# Patient Record
Sex: FEMALE | Race: WHITE | NOT HISPANIC OR LATINO
[De-identification: names, ages, dates, MRNs, and addresses within clinical notes are randomized per-mention and may not be internally consistent; named-entity substitution may affect disease eponyms.]

---

## 2018-09-06 ENCOUNTER — FORM ENCOUNTER (OUTPATIENT)
Age: 38
End: 2018-09-06

## 2018-11-26 ENCOUNTER — FORM ENCOUNTER (OUTPATIENT)
Age: 38
End: 2018-11-26

## 2019-02-12 ENCOUNTER — APPOINTMENT (OUTPATIENT)
Dept: PEDIATRIC ALLERGY IMMUNOLOGY | Facility: CLINIC | Age: 39
End: 2019-02-12
Payer: COMMERCIAL

## 2019-02-12 VITALS
SYSTOLIC BLOOD PRESSURE: 119 MMHG | WEIGHT: 155.21 LBS | HEIGHT: 64.37 IN | OXYGEN SATURATION: 99 % | DIASTOLIC BLOOD PRESSURE: 77 MMHG | HEART RATE: 88 BPM | BODY MASS INDEX: 26.5 KG/M2

## 2019-02-12 DIAGNOSIS — H10.10 ACUTE ATOPIC CONJUNCTIVITIS, UNSPECIFIED EYE: ICD-10-CM

## 2019-02-12 DIAGNOSIS — R91.1 SOLITARY PULMONARY NODULE: ICD-10-CM

## 2019-02-12 DIAGNOSIS — Z82.5 FAMILY HISTORY OF ASTHMA AND OTHER CHRONIC LOWER RESPIRATORY DISEASES: ICD-10-CM

## 2019-02-12 DIAGNOSIS — Z78.9 OTHER SPECIFIED HEALTH STATUS: ICD-10-CM

## 2019-02-12 DIAGNOSIS — J45.50 SEVERE PERSISTENT ASTHMA, UNCOMPLICATED: ICD-10-CM

## 2019-02-12 DIAGNOSIS — L85.3 XEROSIS CUTIS: ICD-10-CM

## 2019-02-12 DIAGNOSIS — T50.905A ADVERSE EFFECT OF UNSPECIFIED DRUGS, MEDICAMENTS AND BIOLOGICAL SUBSTANCES, INITIAL ENCOUNTER: ICD-10-CM

## 2019-02-12 DIAGNOSIS — T78.1XXA OTHER ADVERSE FOOD REACTIONS, NOT ELSEWHERE CLASSIFIED, INITIAL ENCOUNTER: ICD-10-CM

## 2019-02-12 DIAGNOSIS — J30.9 ALLERGIC RHINITIS, UNSPECIFIED: ICD-10-CM

## 2019-02-12 DIAGNOSIS — Z91.018 ALLERGY TO OTHER FOODS: ICD-10-CM

## 2019-02-12 PROCEDURE — 95004 PERQ TESTS W/ALRGNC XTRCS: CPT

## 2019-02-12 PROCEDURE — 99245 OFF/OP CONSLTJ NEW/EST HI 55: CPT | Mod: 25

## 2019-02-12 RX ORDER — MONTELUKAST SODIUM 10 MG/1
10 TABLET, FILM COATED ORAL
Qty: 1 | Refills: 2 | Status: ACTIVE | COMMUNITY

## 2019-02-12 RX ORDER — MOMETASONE FUROATE AND FORMOTEROL FUMARATE DIHYDRATE 100; 5 UG/1; UG/1
100-5 AEROSOL RESPIRATORY (INHALATION)
Qty: 1 | Refills: 1 | Status: ACTIVE | COMMUNITY

## 2019-02-12 NOTE — IMPRESSION
[Allergy Testing Cockroach] : cockroach [Allergy Testing Trees] : trees [Allergy Testing Weeds] : weeds [Allergy Testing Grasses] : grasses [Allergy Testing Dust Mite] : dust mites [Allergy Testing Mixed Feathers] : feathers [Allergy Testing Dog] : dog [Allergy Testing Cat] : cat [] : molds

## 2019-02-13 NOTE — PHYSICAL EXAM
[Alert] : alert [Well Nourished] : well nourished [Healthy Appearance] : healthy appearance [No Acute Distress] : no acute distress [Well Developed] : well developed [Normal Pupil & Iris Size/Symmetry] : normal pupil and iris size and symmetry [No Discharge] : no discharge [No Photophobia] : no photophobia [Sclera Not Icteric] : sclera not icteric [Normal TMs] : both tympanic membranes were normal [Normal Lips/Tongue] : the lips and tongue were normal [Normal Outer Ear/Nose] : the ears and nose were normal in appearance [Normal Tonsils] : normal tonsils [No Thrush] : no thrush [Normal Dentition] : normal dentition [No Oral Lesions or Ulcers] : no oral lesions or ulcers [Boggy Nasal Turbinates] : boggy and/or pale nasal turbinates [Posterior Pharyngeal Cobblestoning] : posterior pharyngeal cobblestoning [No Neck Mass] : no neck mass was observed [No LAD] : no lymphadenopathy [Supple] : the neck was supple [Normal Rate and Effort] : normal respiratory rhythm and effort [No Crackles] : no crackles [No Retractions] : no retractions [Bilateral Audible Breath Sounds] : bilateral audible breath sounds [Normal Rate] : heart rate was normal  [Normal S1, S2] : normal S1 and S2 [No murmur] : no murmur [Regular Rhythm] : with a regular rhythm [Soft] : abdomen soft [Not Distended] : not distended [Normal Cervical Lymph Nodes] : cervical [Skin Intact] : skin intact  [No Rash] : no rash [No Skin Lesions] : no skin lesions [Xerosis] : xerosis [No Cyanosis] : no cyanosis [Normal Mood] : mood was normal [Normal Affect] : affect was normal [Alert, Awake, Oriented as Age-Appropriate] : alert, awake, oriented as age appropriate [Pharyngeal erythema] : no pharyngeal erythema [Exudate] : no exudate [Clear Rhinorrhea] : no clear rhinorrhea was seen [Wheezing] : no wheezing was heard [Eczematous Patches] : no eczematous patches

## 2019-02-13 NOTE — REVIEW OF SYSTEMS
[Eye Itching] : itchy eyes [Rhinorrhea] : rhinorrhea [Nasal Congestion] : nasal congestion [Post Nasal Drip] : post nasal drip [Sneezing] : sneezing [Atopic Dermatitis] : atopic dermatitis [Dry Skin] : ~L dry skin [Nl] : Genitourinary [Eye Discharge] : no eye discharge [Eye Redness] : no redness [Dry Eyes] : no dryness ~T of the eyes [Puffy Eyelids] : no puffy ~T eyelids [Bloodshot Eyes] : no bloodshot ~T eyes [Redness Of Eyelid] : no redness of ~T eyelid [Swollen Eyelids] : no ~T ~L swollen eyelids [Nosebleeds] : no epistaxis [Urticaria] : no urticaria [Pruritis] : no pruritis [Swelling] : no swelling

## 2019-02-13 NOTE — REASON FOR VISIT
[Initial Consultation] : an initial consultation for [FreeTextEntry2] : chronic rhinitis/ h/o itchy eyes, h/o adverse food and drug reaction, xerosis of skin

## 2019-02-13 NOTE — CONSULT LETTER
[Dear  ___] : Dear  [unfilled], [Consult Letter:] : I had the pleasure of evaluating your patient, [unfilled]. [Please see my note below.] : Please see my note below. [Consult Closing:] : Thank you very much for allowing me to participate in the care of this patient.  If you have any questions, please do not hesitate to contact me. [Sincerely,] : Sincerely, [DrKhloe  ___] : Dr. HILLS [FreeTextEntry2] : Dr. ELISSA DESAI,  [FreeTextEntry3] : Tash Harry MD\par Attending Physician, Division of Allergy/Immunology \par Nisreen Mandel [facility] \par [facility] \par  of Medicine and Pediatrics \par [facility]

## 2019-02-13 NOTE — HISTORY OF PRESENT ILLNESS
[Venom Reactions] : venom reactions [de-identified] : 38 year old female with h/o asthma (following with pulmonary), presents in initial consultation for chronic rhinitis/ h/o itchy eyes, h/o adverse food and drug reaction, xerosis of skin: \par \par Chronic rhinitis/ h/o itchy eyes:\par The patient reports h/o nasal congestion, rhinorrhea, sneezing and itching of the eyes all year around, sometimes worse during winter season. The patient has tried Flonase, Claritin , Allegra with limited relief of symptoms. Reports h/o increased sleepiness with Zyrtec. She hasn't tried any eye drops before. She has 2 dogs at home.\par \par Asthma:\par Patient is following with pulmonary for her asthma (Dr. Dominic Scott). She reports being well controlled on Dulera 100 2 puffs twice a day and Singulair. She also reports that she had a CXR last year followed by a CT revealing incidental findings of 2 nodules, for which she is also following with pulmonary. \par \par Xerosis/ dry skin, mostly of hands:\par The patient showers daily, uses regular Dove soap and moisturizes her skin with Jadyn.\par \par Adverse food reaction/Food allergy\par Several years ago after eating Barley soup, patient developed immediate symptoms of nasal congestion, which she perceived as different and more severe than her chronic state of nasal congestion. She has avoided barley since then.\par She reportedly tolerates cow' milk/dairy, egg, wheat, peanut, tree nuts, soy, fish and shellfish with no notable adverse reaction.\par \par H/o adverse drug reaction:\par Patient avoids sulfa drugs, as she reports h/o mouth sores after taking sulfa drugs 10 years ago.

## 2019-02-13 NOTE — SOCIAL HISTORY
[Spouse/Partner] : spouse/partner [Dog] : dog [de-identified] : 2 children [FreeTextEntry2] : not working currently [Cockroaches] : Patient states that there are no cockroaches in the home [Smokers in Household] : there are no smokers in the home [de-identified] : [place] [employer]

## 2020-10-20 ENCOUNTER — FORM ENCOUNTER (OUTPATIENT)
Age: 40
End: 2020-10-20

## 2021-02-22 ENCOUNTER — APPOINTMENT (OUTPATIENT)
Dept: INFECTIOUS DISEASE | Facility: CLINIC | Age: 41
End: 2021-02-22
Payer: COMMERCIAL

## 2021-02-22 VITALS — DIASTOLIC BLOOD PRESSURE: 80 MMHG | SYSTOLIC BLOOD PRESSURE: 120 MMHG | TEMPERATURE: 98.2 F

## 2021-02-22 VITALS — OXYGEN SATURATION: 99 % | HEART RATE: 88 BPM

## 2021-02-22 DIAGNOSIS — R68.89 OTHER GENERAL SYMPTOMS AND SIGNS: ICD-10-CM

## 2021-02-22 DIAGNOSIS — A21.9 TULAREMIA, UNSPECIFIED: ICD-10-CM

## 2021-02-22 PROCEDURE — 99072 ADDL SUPL MATRL&STAF TM PHE: CPT

## 2021-02-22 PROCEDURE — 99203 OFFICE O/P NEW LOW 30 MIN: CPT

## 2021-02-22 RX ORDER — UBIDECARENONE/VIT E ACET 100MG-5
CAPSULE ORAL
Refills: 0 | Status: ACTIVE | COMMUNITY

## 2021-02-22 RX ORDER — KETOTIFEN FUMARATE 0.25 MG/ML
0.03 SOLUTION/ DROPS OPHTHALMIC
Qty: 1 | Refills: 1 | Status: DISCONTINUED | COMMUNITY
Start: 2019-02-12 | End: 2021-02-22

## 2021-02-22 RX ORDER — CAMPHOR 0.45 %
25 GEL (GRAM) TOPICAL
Qty: 1 | Refills: 0 | Status: DISCONTINUED | COMMUNITY
Start: 2019-02-12 | End: 2021-02-22

## 2021-02-22 RX ORDER — LEVOCETIRIZINE DIHYDROCHLORIDE 5 MG/1
5 TABLET ORAL
Qty: 1 | Refills: 2 | Status: DISCONTINUED | COMMUNITY
Start: 2019-02-12 | End: 2021-02-22

## 2021-02-22 NOTE — HISTORY OF PRESENT ILLNESS
[FreeTextEntry1] : Pt is a 41 y/o woman hx asthma who is s/p tx for lymes with 21 days of doxycycline in 10/20. Pt had presented to her PMD with complaints of headache, joint pain cari left knee and hands, no fever or chills.  She underwent a battery of testing and had positive antibody for tularemia on 2 occasions.  No increased swollen glands or ulcerations, no pulmonary symptoms.  Her headaches are mostly resolved but she still has occasional joint pain.

## 2021-02-22 NOTE — ASSESSMENT
[FreeTextEntry1] : 41 y/o woman with asthma with suspected lyme disease.  I suspect her tularemia antibody represents a false positive.\par \par Rec:\par 1. Will repeat Lyme and tularemia testing\par 2. Hold further abx for now\par 3. Will have pt return only if lyme ab are higher- she will call for results later in the week.

## 2021-02-22 NOTE — PHYSICAL EXAM
[General Appearance - Alert] : alert [General Appearance - In No Acute Distress] : in no acute distress [Sclera] : the sclera and conjunctiva were normal [PERRL With Normal Accommodation] : pupils were equal in size, round, reactive to light [Extraocular Movements] : extraocular movements were intact [Outer Ear] : the ears and nose were normal in appearance [Oropharynx] : the oropharynx was normal with no thrush [Neck Cervical Mass (___cm)] : no neck mass was observed [Neck Appearance] : the appearance of the neck was normal [Jugular Venous Distention Increased] : there was no jugular-venous distention [Thyroid Diffuse Enlargement] : the thyroid was not enlarged [Auscultation Breath Sounds / Voice Sounds] : lungs were clear to auscultation bilaterally [Heart Rate And Rhythm] : heart rate was normal and rhythm regular [Heart Sounds Gallop] : no gallops [Heart Sounds] : normal S1 and S2 [Murmurs] : no murmurs [Heart Sounds Pericardial Friction Rub] : no pericardial rub [Full Pulse] : the pedal pulses are present [Edema] : there was no peripheral edema [Abdomen Soft] : soft [Bowel Sounds] : normal bowel sounds [Abdomen Tenderness] : non-tender [Costovertebral Angle Tenderness] : no CVA tenderness [Abdomen Mass (___ Cm)] : no abdominal mass palpated [No Palpable Adenopathy] : no palpable adenopathy [Musculoskeletal - Swelling] : no joint swelling [Nail Clubbing] : no clubbing  or cyanosis of the fingernails [Motor Tone] : muscle strength and tone were normal [Skin Color & Pigmentation] : normal skin color and pigmentation [] : no rash [Deep Tendon Reflexes (DTR)] : deep tendon reflexes were 2+ and symmetric [Sensation] : the sensory exam was normal to light touch and pinprick [No Focal Deficits] : no focal deficits

## 2021-02-23 ENCOUNTER — FORM ENCOUNTER (OUTPATIENT)
Age: 41
End: 2021-02-23

## 2021-10-29 ENCOUNTER — NON-APPOINTMENT (OUTPATIENT)
Age: 41
End: 2021-10-29

## 2021-11-10 ENCOUNTER — APPOINTMENT (OUTPATIENT)
Dept: OBGYN | Facility: CLINIC | Age: 41
End: 2021-11-10
Payer: COMMERCIAL

## 2021-11-10 VITALS
DIASTOLIC BLOOD PRESSURE: 72 MMHG | BODY MASS INDEX: 25.99 KG/M2 | SYSTOLIC BLOOD PRESSURE: 118 MMHG | HEIGHT: 65 IN | WEIGHT: 156 LBS

## 2021-11-10 DIAGNOSIS — Z00.00 ENCOUNTER FOR GENERAL ADULT MEDICAL EXAMINATION W/OUT ABNORMAL FINDINGS: ICD-10-CM

## 2021-11-10 PROCEDURE — 99396 PREV VISIT EST AGE 40-64: CPT

## 2021-11-10 NOTE — HISTORY OF PRESENT ILLNESS
[Patient reported mammogram was normal] : Patient reported mammogram was normal [Patient reported breast sonogram was normal] : Patient reported breast sonogram was normal [Patient reported PAP Smear was normal] : Patient reported PAP Smear was normal [Yes] : Patient has concerns regarding sex [Currently Active] : currently active [Men] : men [Vaginal] : vaginal [Mammogramdate] : 02/2021 [BreastSonogramDate] : 02/2021 [PapSmeardate] : 10/21/20 [LMPDate] : 10/20/21 [MensesFreq] : 28 [MensesLength] : 5 [PGHxTotal] : 3 [[facility]] : 2 [[facility]] : 2 [PGHxABInduced] : 1 [TextBox_6] : 10/20/2112 [FreeTextEntry1] : 10/20/21

## 2021-11-10 NOTE — PLAN
[FreeTextEntry1] : 41-year-old for normal annual GYN exam family history of prostate cancer BRCA positive sister with breast cancer BRCA negative patient will be sent to [facility] for counseling and further work-up of BRCA status

## 2021-11-15 LAB
C TRACH RRNA SPEC QL NAA+PROBE: NOT DETECTED
N GONORRHOEA RRNA SPEC QL NAA+PROBE: NOT DETECTED
SOURCE TP AMPLIFICATION: NORMAL

## 2022-04-19 ENCOUNTER — ASOB RESULT (OUTPATIENT)
Age: 42
End: 2022-04-19

## 2022-04-19 ENCOUNTER — APPOINTMENT (OUTPATIENT)
Dept: MATERNAL FETAL MEDICINE | Facility: CLINIC | Age: 42
End: 2022-04-19
Payer: COMMERCIAL

## 2022-04-19 PROCEDURE — 99202 OFFICE O/P NEW SF 15 MIN: CPT | Mod: 95

## 2022-04-20 ENCOUNTER — APPOINTMENT (OUTPATIENT)
Dept: OBGYN | Facility: CLINIC | Age: 42
End: 2022-04-20
Payer: COMMERCIAL

## 2022-04-20 VITALS
WEIGHT: 158 LBS | DIASTOLIC BLOOD PRESSURE: 70 MMHG | BODY MASS INDEX: 26.33 KG/M2 | HEIGHT: 65 IN | SYSTOLIC BLOOD PRESSURE: 110 MMHG

## 2022-04-20 PROCEDURE — 99214 OFFICE O/P EST MOD 30 MIN: CPT

## 2022-04-20 NOTE — HISTORY OF PRESENT ILLNESS
[Patient reported mammogram was normal] : Patient reported mammogram was normal [Patient reported breast sonogram was normal] : Patient reported breast sonogram was normal [Patient reported PAP Smear was normal] : Patient reported PAP Smear was normal [HIV test declined] : HIV test declined [Syphilis test declined] : Syphilis test declined [Gonorrhea test offered] : Gonorrhea test offered [Chlamydia test offered] : Chlamydia test offered [Trichomonas test declined] : Trichomonas test declined [HPV test declined] : HPV test declined [Hepatitis B test declined] : Hepatitis B test declined [Hepatitis C test declined] : Hepatitis C test declined [N] : Patient reports normal menses [Vasectomy (partner)] : has a partner with a vasectomy [Y] : Positive pregnancy history [Menarche Age: ____] : age at menarche was [unfilled] [No] : Patient does not have concerns regarding sex [Currently Active] : currently active [Men] : men [TextBox_4] : PT PRESENTS TODAY FOR RESULTS CONSULT.  [Mammogramdate] : 02/28/22 [BreastSonogramDate] : 02/28/22 [PapSmeardate] : 10/21/2020 [TextBox_31] : N [GonorrheaDate] : 11/10/21 [TextBox_63] : NEGATIVE [ChlamydiaDate] : 11/10/21 [TextBox_68] : NEGATIVE [LMPDate] : 04/13/22 [PGHxTotal] : 2 [[facility]] : 1 [PGxPremature] : 1 [[facility] Center[facility]Living] : 2 [FreeTextEntry1] : 04/13/22

## 2022-04-27 ENCOUNTER — FORM ENCOUNTER (OUTPATIENT)
Age: 42
End: 2022-04-27

## 2022-05-05 ENCOUNTER — APPOINTMENT (OUTPATIENT)
Dept: GYNECOLOGIC ONCOLOGY | Facility: CLINIC | Age: 42
End: 2022-05-05
Payer: COMMERCIAL

## 2022-05-05 VITALS
HEIGHT: 65 IN | DIASTOLIC BLOOD PRESSURE: 72 MMHG | OXYGEN SATURATION: 100 % | SYSTOLIC BLOOD PRESSURE: 119 MMHG | RESPIRATION RATE: 16 BRPM | BODY MASS INDEX: 26.33 KG/M2 | WEIGHT: 158 LBS | HEART RATE: 84 BPM

## 2022-05-05 DIAGNOSIS — Z78.9 OTHER SPECIFIED HEALTH STATUS: ICD-10-CM

## 2022-05-05 DIAGNOSIS — Z87.891 PERSONAL HISTORY OF NICOTINE DEPENDENCE: ICD-10-CM

## 2022-05-05 PROCEDURE — 99204 OFFICE O/P NEW MOD 45 MIN: CPT

## 2022-05-05 RX ORDER — ALBUTEROL SULFATE 90 UG/1
108 (90 BASE) AEROSOL, METERED RESPIRATORY (INHALATION)
Qty: 1 | Refills: 0 | Status: DISCONTINUED | COMMUNITY
End: 2022-05-05

## 2022-05-05 RX ORDER — FLUTICASONE PROPIONATE 50 UG/1
50 SPRAY, METERED NASAL DAILY
Qty: 1 | Refills: 1 | Status: DISCONTINUED | COMMUNITY
Start: 2019-02-12 | End: 2022-05-05

## 2022-05-05 RX ORDER — MOMETASONE FUROATE AND FORMOTEROL FUMARATE DIHYDRATE 100; 5 UG/1; UG/1
100-5 AEROSOL RESPIRATORY (INHALATION)
Refills: 0 | Status: DISCONTINUED | COMMUNITY
End: 2022-05-05

## 2022-05-05 RX ORDER — EPINEPHRINE 0.3 MG/.3ML
0.3 INJECTION INTRAMUSCULAR
Qty: 2 | Refills: 3 | Status: DISCONTINUED | COMMUNITY
Start: 2019-02-12 | End: 2022-05-05

## 2022-08-10 ENCOUNTER — APPOINTMENT (OUTPATIENT)
Dept: OBGYN | Facility: CLINIC | Age: 42
End: 2022-08-10

## 2022-09-09 DIAGNOSIS — F41.9 ANXIETY DISORDER, UNSPECIFIED: ICD-10-CM

## 2022-09-09 RX ORDER — ALPRAZOLAM 0.25 MG/1
0.25 TABLET ORAL
Qty: 2 | Refills: 0 | Status: ACTIVE | COMMUNITY
Start: 2022-09-09 | End: 1900-01-01

## 2022-09-19 ENCOUNTER — NON-APPOINTMENT (OUTPATIENT)
Age: 42
End: 2022-09-19

## 2022-09-19 DIAGNOSIS — R92.2 INCONCLUSIVE MAMMOGRAM: ICD-10-CM

## 2022-09-19 DIAGNOSIS — Z12.31 ENCOUNTER FOR SCREENING MAMMOGRAM FOR MALIGNANT NEOPLASM OF BREAST: ICD-10-CM

## 2022-11-14 ENCOUNTER — APPOINTMENT (OUTPATIENT)
Dept: OBGYN | Facility: CLINIC | Age: 42
End: 2022-11-14

## 2022-11-14 ENCOUNTER — NON-APPOINTMENT (OUTPATIENT)
Age: 42
End: 2022-11-14

## 2022-11-14 VITALS
DIASTOLIC BLOOD PRESSURE: 70 MMHG | BODY MASS INDEX: 26.33 KG/M2 | SYSTOLIC BLOOD PRESSURE: 116 MMHG | HEIGHT: 65 IN | WEIGHT: 158 LBS

## 2022-11-14 DIAGNOSIS — Z91.89 OTHER SPECIFIED PERSONAL RISK FACTORS, NOT ELSEWHERE CLASSIFIED: ICD-10-CM

## 2022-11-14 DIAGNOSIS — Z12.4 ENCOUNTER FOR SCREENING FOR MALIGNANT NEOPLASM OF CERVIX: ICD-10-CM

## 2022-11-14 DIAGNOSIS — Z11.3 ENCOUNTER FOR SCREENING FOR INFECTIONS WITH A PREDOMINANTLY SEXUAL MODE OF TRANSMISSION: ICD-10-CM

## 2022-11-14 PROCEDURE — 99396 PREV VISIT EST AGE 40-64: CPT

## 2022-11-14 NOTE — HISTORY OF PRESENT ILLNESS
[N] : Patient does not use contraception [Y] : Patient is sexually active [Menarche Age: ____] : age at menarche was [unfilled] [Currently Active] : currently active [Mammogramdate] : 02/28/22 [TextBox_19] : BR 1 / Breast MRI 09/14/2022 BR 1 [BreastSonogramDate] : 02/28/22 [TextBox_25] : BR 1 [PapSmeardate] : 10/21/22 [TextBox_31] : NEG [GonorrheaDate] : 11/10/21 [TextBox_63] : NEG [ChlamydiaDate] : 11/10/21 [TextBox_68] : NEG [LMPDate] : 11/04/22 [PGHxTotal] : 2 [[facility]FullTerm] : 2 [[facility] Center[facility]Living] : 2 [FreeTextEntry1] : 11/04/22

## 2022-11-14 NOTE — PLAN
[FreeTextEntry1] : Pap smear completed breast self-exam taught, patient will have transvaginal sonogram every 6 months she is now scheduled EMB for thickened endometrium on sonogram 11/27/2022 she needs a mammogram and bilateral breast sonogram in February 2023 she will continue multivitamin D3, she has been referred to breast surgeon Dr. Tee marie and she will follow-up every 6 months with .  She will return here in 12 months

## 2022-11-14 NOTE — PHYSICAL EXAM

## 2022-11-15 LAB — HPV HIGH+LOW RISK DNA PNL CVX: NOT DETECTED

## 2022-11-21 LAB — CYTOLOGY CVX/VAG DOC THIN PREP: NORMAL

## 2022-11-29 ENCOUNTER — APPOINTMENT (OUTPATIENT)
Dept: OBGYN | Facility: CLINIC | Age: 42
End: 2022-11-29

## 2022-11-29 DIAGNOSIS — Z13.9 ENCOUNTER FOR SCREENING, UNSPECIFIED: ICD-10-CM

## 2022-11-29 DIAGNOSIS — N93.9 ABNORMAL UTERINE AND VAGINAL BLEEDING, UNSPECIFIED: ICD-10-CM

## 2022-11-29 LAB
HCG UR QL: NEGATIVE
QUALITY CONTROL: YES

## 2022-11-29 PROCEDURE — 58100 BIOPSY OF UTERUS LINING: CPT

## 2022-11-29 PROCEDURE — 81025 URINE PREGNANCY TEST: CPT

## 2022-11-29 NOTE — PROCEDURE
[Endometrial Biopsy] : Endometrial biopsy [Thickened Endometrium] : thickened endometrium [Time out performed] : Pre-procedure time out performed.  Patient's name, date of birth and procedure confirmed. [Consent Obtained] : Consent obtained [Pre-op Evaluation] : Pre-op evaluation [Risks] : risks [Benefits] : benefits [Alternatives] : alternatives [Patient] : patient [Infection] : infection [Bleeding] : bleeding [Allergic Reaction] : allergic reaction [Uterine Perforation] : uterine perforation [Pain] : pain [Ibuprofen ___ mg] : ibuprofen [unfilled] ~Umg [Betadine] : Betadine [None] : none [Tenaculum] : Tenaculum [Easy Passage] : Easy passage [Sounded to ___ cm] : sounded to [unfilled] ~Ucm [Anteverted] : anteverted [Scant] : scant [Sent to Pathology] : placed in buffered formalin and sent for pathology [Tolerated Well] : Patient tolerated the procedure well [No Complications] : No complications [de-identified] : Structures given Motrin for pain

## 2023-01-26 ENCOUNTER — APPOINTMENT (OUTPATIENT)
Dept: SURGERY | Facility: CLINIC | Age: 43
End: 2023-01-26
Payer: COMMERCIAL

## 2023-01-26 VITALS
HEART RATE: 93 BPM | TEMPERATURE: 97.7 F | BODY MASS INDEX: 26.66 KG/M2 | DIASTOLIC BLOOD PRESSURE: 82 MMHG | HEIGHT: 65 IN | WEIGHT: 160 LBS | OXYGEN SATURATION: 99 % | SYSTOLIC BLOOD PRESSURE: 138 MMHG

## 2023-01-26 DIAGNOSIS — Z15.89 GENETIC SUSCEPTIBILITY TO OTHER DISEASE: ICD-10-CM

## 2023-01-26 DIAGNOSIS — Z80.3 FAMILY HISTORY OF MALIGNANT NEOPLASM OF BREAST: ICD-10-CM

## 2023-01-26 PROCEDURE — 99203 OFFICE O/P NEW LOW 30 MIN: CPT

## 2023-05-04 ENCOUNTER — APPOINTMENT (OUTPATIENT)
Dept: GYNECOLOGIC ONCOLOGY | Facility: CLINIC | Age: 43
End: 2023-05-04

## 2023-05-14 ENCOUNTER — FORM ENCOUNTER (OUTPATIENT)
Age: 43
End: 2023-05-14

## 2023-05-19 ENCOUNTER — APPOINTMENT (OUTPATIENT)
Dept: GYNECOLOGIC ONCOLOGY | Facility: CLINIC | Age: 43
End: 2023-05-19
Payer: COMMERCIAL

## 2023-05-19 ENCOUNTER — TRANSCRIPTION ENCOUNTER (OUTPATIENT)
Age: 43
End: 2023-05-19

## 2023-05-19 VITALS
DIASTOLIC BLOOD PRESSURE: 85 MMHG | SYSTOLIC BLOOD PRESSURE: 124 MMHG | HEART RATE: 80 BPM | OXYGEN SATURATION: 100 % | WEIGHT: 159 LBS | BODY MASS INDEX: 26.49 KG/M2 | HEIGHT: 65 IN

## 2023-05-19 PROCEDURE — 99214 OFFICE O/P EST MOD 30 MIN: CPT

## 2023-06-06 NOTE — HISTORY OF PRESENT ILLNESS
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 Presented to my office 1 year ago for a consultation. I discussed with the patient her lifetime risk of developing both breast and ovarian cancer. With her gene mutation, it is acceptable for her to hold off on risk reducing surgical intervention at this time. I do recommend she undergo surgery prior to menopause, however. We discussed possible side effects of undergoing surgery prior to menopause, and I discussed with patient the that we will be able to supplement her hormones at a low dose. In the meantime, I recommend pelvic ultrasounds every 6 months and alternating office visits between myself and Dr. Blackburn. I also encouraged patient to alternate breast US and MRI every 6 months as well. Patient is aware that she can change her mind regarding surgery at any point in time and is encouraged to contact my office if there are any changes. She returns to the office today for a 1 year follow up. \par \par Patient saw Dr. Blackburn 11/2022: \par \par US performed at [facility] 11/2/2022 revealed 1.2 cm thick endometrial lining with heterogenous appearing cervix. Follow up recommended\par Fibroids. \par \par Patient had another US on 5/15/23 which revealed: Possible 8 mm endometrial polyp. Further evaluation may be helpful with hysterosonography as clinically indicated. \par A 1.7 cm left ovarian cyst is likely an involuting follicle. \par A single fibroid in the uterus is without significant change. \par Normal right ovary. \par \par HM: \par MRI Breast: 9/14/22: No MRI evidence of malignancy. \par Mammogram, B/L breast US: 2/28/23:BI-RADS 1 negative. \par CPAP: 11/2022 negative  none

## 2023-06-06 NOTE — REASON FOR VISIT
[FreeTextEntry1] : [place] Location \par \par [facility] [place]. [phone]\par [address] [place] [id number] \par \par Abnormal genetic testing- RAD51C gene mutation \par LMP 4/30/23

## 2023-06-06 NOTE — ASSESSMENT
[FreeTextEntry1] : I reviewed patients US report which I advised the patient overall looks good. She has a cyst on her left ovary that could be ovulatory, I recommended we repeat the US after 2 menstral cycles because of genetic mutation. Advised patient that we want to really time it. She reports no abnormal bleeding, her menstrual cycles are regular and monthly. There is also likely a benign polyp seen in the endometrium, if patient were to have abnormal bleeding then a biopsy would be warranted. \par \par Advised patient she should do US before she ovulates. From stand point of options, could talk about removal of ovaries in about 2-3 years. If we do it sooner, would need possible estrogen replacement. low dose short duration to sustain her from menopause stand point. Discussed risk of removal of ovaries and estrogen. \par \par Patient asked about supracervical hysterectomy. Could do laparoscopically. \par UTERUS may resolve once ovaries removed and estrogen goes down. Patient worried about findings on uterus. \par \par \par

## 2023-06-06 NOTE — END OF VISIT
[FreeTextEntry3] : Written by Mariaa Oquendo, acting as a scribe for Dr. Rema Smith This note accurately reflects the work and decisions made by me.

## 2023-06-29 ENCOUNTER — APPOINTMENT (OUTPATIENT)
Dept: SURGERY | Facility: CLINIC | Age: 43
End: 2023-06-29

## 2023-07-14 ENCOUNTER — APPOINTMENT (OUTPATIENT)
Dept: GYNECOLOGIC ONCOLOGY | Facility: CLINIC | Age: 43
End: 2023-07-14
Payer: COMMERCIAL

## 2023-07-14 PROCEDURE — 99213 OFFICE O/P EST LOW 20 MIN: CPT | Mod: 95

## 2023-07-27 ENCOUNTER — APPOINTMENT (OUTPATIENT)
Dept: ORTHOPEDIC SURGERY | Facility: CLINIC | Age: 43
End: 2023-07-27
Payer: COMMERCIAL

## 2023-07-27 VITALS — HEIGHT: 65 IN | BODY MASS INDEX: 26.49 KG/M2 | WEIGHT: 159 LBS

## 2023-07-27 PROCEDURE — 99204 OFFICE O/P NEW MOD 45 MIN: CPT

## 2023-07-27 NOTE — PHYSICAL EXAM
[de-identified] : General: Well appearing, no acute distress\par Neurologic: A&Ox3, No focal deficits\par Head: NCAT without abrasions, lacerations, or ecchymosis to head, face, or scalp\par Eyes: No scleral icterus, no gross abnormalities\par Respiratory: Equal chest wall expansion bilaterally, no accessory muscle use\par Lymphatic: No lymphadenopathy palpated\par Skin: Warm and dry\par Psychiatric: Normal mood and affect\par \par Examination of the Left knee reveals crepitus with flexion. There are no leg length discrepancies appreciated. 1-2 deg recurvatum of left knee with no pain. Some discomfort with hyperflexion of knee. Tenderness over medial epicondyle, moderate tenderness of joint line. Positive Tien's.  The knee is stable to Lachman testing, as well as anterior and posterior drawer. There is no valgus or varus instability. The calf and thigh are soft, supple, and nontender. The patient is grossly neurovascularly intact distally.\par \par Examination of the Right knee reveals no significant effusion, erythema, or ecchymosis. There are no leg length discrepancies appreciated. The patient's ROM is from 0-130 degrees. The knee is stable to Lachman testing, as well as anterior and posterior drawer. There is no valgus or varus instability. The calf and thigh are soft, supple, and nontender. The patient is grossly neurovascularly intact distally. [de-identified] : MRI [facility]\par \par MRI Left Knee\par \par Date of Exam: 5/2/2023\par \par Impression:\par 1. MRI of the LEFT knee demonstrates posterior medial meniscocapsular junction sprain\par 2. Tearing of the medial and lateral meniscus\par 3. Mild tricompartmental chondral fissuring\par 4. Prepatellar bursitis\par 5. Small joint effusion\par 6. Thickened medial plica

## 2023-07-27 NOTE — ADDENDUM
[FreeTextEntry1] : Documented by Es Reilly acting as a scribe for Dr. Kemp on 07/27/2023. All medical record entries made by the Scribe were at my, Dr. Kemp's, direction and personally dictated by me on 07/27/2023. I have reviewed the chart and agree that the record accurately reflects my personal performance of the history, physical exam, procedure and imaging.\par
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
## 2023-07-27 NOTE — DISCUSSION/SUMMARY
[de-identified] : We had a thorough discussion regarding the nature of her pain, the pathophysiology, as well as all treatment options. Based on clinical exam, MRI and radiograph findings she has tearing of the medial and lateral meniscus. I discussed operative and non-operative treatment modalities. Patient at this time should continue with physical therapy and send me a disc of her MRI. Patient was given prescription of formal physical therapy that she will perform 2x/wk for 6-8 wks. All questions were answered and the patient verbalized understanding. The patient is in agreement with this treatment plan. Patient should follow up with me in six weeks. \par \par \par

## 2023-08-08 NOTE — END OF VISIT
[Time Spent: ___ minutes] : I have spent [unfilled] minutes of time on the encounter. [FreeTextEntry3] : Written by Mariaa Oquendo, acting as a scribe for Dr. Rema Smith This note accurately reflects the work and decisions made by me.

## 2023-08-08 NOTE — REASON FOR VISIT
[Home] : at home, [unfilled] , at the time of the visit. [Medical Office: ([facility])___] : at the medical office located in  [Patient] : the patient [This encounter was initiated by telehealth (audio with video) and converted to telephone (audio only) due to technical difficulties.] : This encounter was initiated by telehealth (audio with video) and converted to telephone (audio only) due to technical difficulties. [FreeTextEntry1] : Abnormal genetic testing- RAD51C gene mutation

## 2023-08-08 NOTE — ASSESSMENT
[FreeTextEntry1] : Follow up for repeat US , No cyst \par Patient reports thicker periods. advised patient not concerned not necessarily changing. \par If having abnormal VB, would be concerned. \par \par Risk for cancer developing is around or shortly after menopause, from my stand point suggesting 47-48 before surgery. could always discuss having surgery sooner if patient feels menopausal. \par Patient could be at perimenopause now. \par Discussed risk of early surgical induced menopause. \par Follow up in 1 year. Will see Dr. Blackburn in November and she will follow up with myself in May \par \par Can have US done in January. \par She will have Dr. Blackburn order it at her next visit. \par \par When patient proceeds with surgery she would like to proceed with Supracervical hysterectomy BSO given no hx of abnormal pap. \par

## 2023-08-08 NOTE — HISTORY OF PRESENT ILLNESS
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 Presented to my office 1 year ago for a consultation. I discussed with the patient her lifetime risk of developing both breast and ovarian cancer. With her gene mutation, it is acceptable for her to hold off on risk reducing surgical intervention at this time. I do recommend she undergo surgery prior to menopause, however. We discussed possible side effects of undergoing surgery prior to menopause, and I discussed with patient the that we will be able to supplement her hormones at a low dose. In the meantime, I recommend pelvic ultrasounds every 6 months and alternating office visits between myself and Dr. Blackburn. I also encouraged patient to alternate breast US and MRI every 6 months as well. Patient is aware that she can change her mind regarding surgery at any point in time and is encouraged to contact my office if there are any changes. She returns to the office today for a 1 year follow up. \par \par Patient saw Dr. Blackburn 11/2022: \par \par US performed at [facility] 11/2/2022 revealed 1.2 cm thick endometrial lining with heterogenous appearing cervix. Follow up recommended\par Fibroids. \par \par Patient had another US on 5/15/23 which revealed: Possible 8 mm endometrial polyp. Further evaluation may be helpful with hysterosonography as clinically indicated. \par A 1.7 cm left ovarian cyst is likely an involuting follicle. \par A single fibroid in the uterus is without significant change. \par Normal right ovary. \par \par on 5/19/23 I reviewed US with patient and advised her she had a cyst on her left ovary that could have been ovulatory. I recommended a repeat US after 2 menstrual cycles because of genetic mutation. [facility]. \par \par Pelvic US 7/6/23: impression revealed possible endometrial polyp measuring up to 0.7 cm, similar to ultrasound on 5/15/23. Small subserosal fibroid. \par \par HM: \par MRI Breast: 9/14/22: No MRI evidence of malignancy. \par Mammogram, B/L breast US: 2/28/23:BI-RADS 1 negative. \par CPAP: 11/2022 negative

## 2023-08-08 NOTE — PHYSICAL EXAM
[Normal] : Mood and affect: Normal [FreeTextEntry1] : Mariaa Oquendo Medical assistant was present during telehealth visit.

## 2023-09-11 ENCOUNTER — APPOINTMENT (OUTPATIENT)
Dept: ORTHOPEDIC SURGERY | Facility: CLINIC | Age: 43
End: 2023-09-11
Payer: COMMERCIAL

## 2023-09-11 VITALS — BODY MASS INDEX: 26.49 KG/M2 | HEIGHT: 65 IN | WEIGHT: 159 LBS

## 2023-09-11 DIAGNOSIS — S83.207A UNSPECIFIED TEAR OF UNSPECIFIED MENISCUS, CURRENT INJURY, LEFT KNEE, INITIAL ENCOUNTER: ICD-10-CM

## 2023-09-11 PROCEDURE — 99214 OFFICE O/P EST MOD 30 MIN: CPT | Mod: 25

## 2023-09-11 PROCEDURE — 20611 DRAIN/INJ JOINT/BURSA W/US: CPT | Mod: LT

## 2023-11-17 ENCOUNTER — NON-APPOINTMENT (OUTPATIENT)
Age: 43
End: 2023-11-17

## 2023-11-17 ENCOUNTER — APPOINTMENT (OUTPATIENT)
Dept: OBGYN | Facility: CLINIC | Age: 43
End: 2023-11-17
Payer: COMMERCIAL

## 2023-11-17 VITALS
HEIGHT: 65 IN | DIASTOLIC BLOOD PRESSURE: 75 MMHG | SYSTOLIC BLOOD PRESSURE: 109 MMHG | BODY MASS INDEX: 26.66 KG/M2 | WEIGHT: 160 LBS

## 2023-11-17 DIAGNOSIS — Z11.51 ENCOUNTER FOR SCREENING FOR HUMAN PAPILLOMAVIRUS (HPV): ICD-10-CM

## 2023-11-17 DIAGNOSIS — Z01.419 ENCOUNTER FOR GYNECOLOGICAL EXAMINATION (GENERAL) (ROUTINE) W/OUT ABNORMAL FINDINGS: ICD-10-CM

## 2023-11-17 DIAGNOSIS — Z15.02 GENETIC SUSCEPTIBILITY TO MALIGNANT NEOPLASM OF OVARY: ICD-10-CM

## 2023-11-17 PROCEDURE — 99396 PREV VISIT EST AGE 40-64: CPT

## 2023-11-20 LAB
C TRACH RRNA SPEC QL NAA+PROBE: NOT DETECTED
HPV HIGH+LOW RISK DNA PNL CVX: NOT DETECTED
N GONORRHOEA RRNA SPEC QL NAA+PROBE: NOT DETECTED
SOURCE TP AMPLIFICATION: NORMAL

## 2023-11-22 LAB — CYTOLOGY CVX/VAG DOC THIN PREP: NORMAL

## 2024-01-31 ENCOUNTER — APPOINTMENT (OUTPATIENT)
Dept: GYNECOLOGIC ONCOLOGY | Facility: CLINIC | Age: 44
End: 2024-01-31

## 2024-05-16 ENCOUNTER — APPOINTMENT (OUTPATIENT)
Dept: GYNECOLOGIC ONCOLOGY | Facility: CLINIC | Age: 44
End: 2024-05-16
Payer: COMMERCIAL

## 2024-05-16 VITALS
RESPIRATION RATE: 16 BRPM | HEART RATE: 81 BPM | DIASTOLIC BLOOD PRESSURE: 88 MMHG | OXYGEN SATURATION: 100 % | HEIGHT: 65 IN | BODY MASS INDEX: 26.66 KG/M2 | WEIGHT: 160 LBS | SYSTOLIC BLOOD PRESSURE: 137 MMHG

## 2024-05-16 DIAGNOSIS — Z15.01 GENETIC SUSCEPTIBILITY TO OTHER DISEASE: ICD-10-CM

## 2024-05-16 DIAGNOSIS — Z15.89 GENETIC SUSCEPTIBILITY TO OTHER DISEASE: ICD-10-CM

## 2024-05-16 PROCEDURE — 99214 OFFICE O/P EST MOD 30 MIN: CPT

## 2024-05-20 PROBLEM — Z15.89 BIALLELIC MUTATION OF RAD51C GENE: Status: ACTIVE | Noted: 2023-01-26

## 2024-05-22 NOTE — HISTORY OF PRESENT ILLNESS
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 She is in the midst of coordinating a breast lift this year, with a plan for subsequent B/L mastectomy with reconstruction next year.  Breast surgery: Dr. Swanson Plastics: Dr. Jean -- she is also planning for future abdominoplasty.  She has noticed heavier bleeding with menses since 12/2023 which is worsening over time. Heavier bleeding occurs on days 1-2 during her menses for which she requires doubling up on sanitary pads. She denies any bleeding in between menses. The pt inquires about MARSHA & would like to know if this can be combined with her breast surgery.  Transvaginal US (1/25/24): - 2.1 cm posterior subserosal fibroid, larger than on the prior exam - 0.6 cm endometrial polyp, unchanged

## 2024-05-22 NOTE — ASSESSMENT
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 I reviewed patient's recent US from January which is overall stable. I recommended endometrial biopsy in office to r/o precancerous condition or cancer. She declines today in office though I advised the pt that I would pursue biopsy prior to surgery to ensure that supracervical hysterectomy is reasonable. If precancerous condition or cancer is noted, MARSHA is not recommended. In the absence of a biopsy today, I am ordering a repeat US to reevaluate the endometrium -- this should be done by July.  Her bleeding may be related to being perimenopausal though unsure without a diagnostic tissue biopsy.  With a current plan for multiple breast surgeries, she should return in 6 months to begin surgical planning at which point, will pursue EMB. She knows to contact my office sooner than her next appointment if bleeding becomes heavier or notices any changes to her bleeding pattern.  She also inquires about polypectomy in the absence of a hysterectomy at this time, though admits she would like to have hysterectomy done eventually. I explained that with her genetic risk alone, a hysterectomy is not necessarily recommended & as long as polyp is stable on next US it may not likely be compatible with cancer or hyperplasia -- recommend removal of tubes & ovaries only as it relates to her genetic mutation.

## 2024-05-22 NOTE — END OF VISIT
[FreeTextEntry3] : Written by Saniya Fuchs, acting as a scribe for Dr. Rema Nogueira. This note accurately reflects the work and decisions made by me.

## 2024-05-22 NOTE — PLAN
[TextEntry] : Return in 6 months for US review/EMB if warranted & surgical planning [possible coordination with breast surgery]

## 2024-05-22 NOTE — PHYSICAL EXAM
[89425] : A chaperone was present during the pelvic exam. [Normal] : Bimanual Exam: Normal [FreeTextEntry2] : Saniya Fuchs MA  [de-identified] : Low transverse scar [de-identified] : AV uterus, mobile

## 2024-05-22 NOTE — REASON FOR VISIT
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

## 2024-06-21 ENCOUNTER — APPOINTMENT (OUTPATIENT)
Dept: GYNECOLOGIC ONCOLOGY | Facility: CLINIC | Age: 44
End: 2024-06-21

## 2024-06-21 PROCEDURE — 99213 OFFICE O/P EST LOW 20 MIN: CPT

## 2024-06-21 NOTE — ASSESSMENT
[FreeTextEntry1] : In reviewing the US , findings indicate normal ovaries, a small uterine fibroid, and a minor polyp on the uterine lining.   Patient reported vaginal bleeding of lesser severity compared to a previous occurrence. Surgical options were talked about with the patient and patients' timeline is going into next year mid march.   We will revisit surgical options and 6 months and will get a repeat US to confirm stability.

## 2024-06-21 NOTE — REASON FOR VISIT
[Home] : at home, [unfilled] , at the time of the visit. [Other Location: e.g. 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

## 2024-06-21 NOTE — END OF VISIT
[Time Spent: ___ minutes] : I have spent [unfilled] minutes of time on the encounter. [FreeTextEntry3] : Written by Rama Estrada, acting as a scribe for Dr. Rema Nogueira. This note accurately reflects the work and decisions made by me.

## 2024-06-21 NOTE — PLAN
[TextEntry] : Surgical scheduling Mid march 2025 Oct 21 has a Breast lift  f/u in 6 months for in depth talk about surgery 6 month US

## 2024-06-21 NOTE — HISTORY OF PRESENT ILLNESS
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 She is in the midst of coordinating a breast lift this year, with a plan for subsequent B/L mastectomy with reconstruction next year. No complaints and is feeling well from a gynecologic standpoint. Patient had an US done and presents today to go over results   Breast surgery: Dr. Swanson Plastics: Dr. Jean -- she is also planning for future abdominoplasty.  She has noticed heavier bleeding with menses since 12/2023 which is worsening over time. Heavier bleeding occurs on days 1-2 during her menses for which she requires doubling up on sanitary pads. She denies any bleeding in between menses.  Transvaginal US (6/13/24) - 1.4cm posterior uterine body subserosal myoma without significant change - 0.5 cm hyperechoic nodular focus within the endometrium likely representing endometrial poylp without significant change -Unremarkable ovaries

## 2024-07-29 ENCOUNTER — APPOINTMENT (OUTPATIENT)
Dept: ORTHOPEDIC SURGERY | Facility: CLINIC | Age: 44
End: 2024-07-29
Payer: COMMERCIAL

## 2024-07-29 DIAGNOSIS — S83.207A UNSPECIFIED TEAR OF UNSPECIFIED MENISCUS, CURRENT INJURY, LEFT KNEE, INITIAL ENCOUNTER: ICD-10-CM

## 2024-07-29 PROCEDURE — 99214 OFFICE O/P EST MOD 30 MIN: CPT

## 2024-07-29 NOTE — HISTORY OF PRESENT ILLNESS
[de-identified] : ALEXANDRIA HAWKINS is a 44 year old female being seen for left knee pain. Endorses walking around in flip flops recently. Has been utilizing ice, heat and biofreeze with some mild relief. Also has been utilizing Voltaren gel. Denies buckling or catching to the knee.

## 2024-07-29 NOTE — PHYSICAL EXAM
[de-identified] : General: Well appearing, no acute distress Neurologic: A&Ox3, No focal deficits Head: NCAT without abrasions, lacerations, or ecchymosis to head, face, or scalp Eyes: No scleral icterus, no gross abnormalities Respiratory: Equal chest wall expansion bilaterally, no accessory muscle use Lymphatic: No lymphadenopathy palpated Skin: Warm and dry Psychiatric: Normal mood and affect  The left knee is examined and reveals no swelling, ecchymosis, erythema, or deformity. The patients range of motion is from 0-130 degrees pain free and fluid. There is no crepitus felt. The patient has no tenderness to palpation in the medial or lateral joint lines. There is no patellar facet tenderness. The patient has 5/5 strength to resisted hip flexion, VMO isolation, knee flexion and extension. The patient is stable to anterior and posterior draw. The patient is stable to Lochman testing. There is no valgus or varus ligamentous instability. Negative Tien and Grind tests. There is no pain with patellar mobility or apprehension testing. The calf and thigh are soft, supple, and nontender. The patient is grossly neurovascularly intact distally. [de-identified] : No new imaging.

## 2024-07-29 NOTE — DISCUSSION/SUMMARY
[de-identified] : We had a thorough discussion regarding the nature of her pain, the pathophysiology, as well as all treatment options. I discussed operative and non-operative treatment modalities. Patient was given prescription of formal physical therapy that she will perform 2x/wk for 6-8 wks. All questions were answered and the patient verbalized understanding. The patient is in agreement with this treatment plan. Patient will follow up in 6-8 wks for repeat clinical assessment.

## 2024-07-29 NOTE — PHYSICAL EXAM
[de-identified] : General: Well appearing, no acute distress Neurologic: A&Ox3, No focal deficits Head: NCAT without abrasions, lacerations, or ecchymosis to head, face, or scalp Eyes: No scleral icterus, no gross abnormalities Respiratory: Equal chest wall expansion bilaterally, no accessory muscle use Lymphatic: No lymphadenopathy palpated Skin: Warm and dry Psychiatric: Normal mood and affect  The left knee is examined and reveals no swelling, ecchymosis, erythema, or deformity. The patients range of motion is from 0-130 degrees pain free and fluid. There is no crepitus felt. The patient has no tenderness to palpation in the medial or lateral joint lines. There is no patellar facet tenderness. The patient has 5/5 strength to resisted hip flexion, VMO isolation, knee flexion and extension. The patient is stable to anterior and posterior draw. The patient is stable to Lochman testing. There is no valgus or varus ligamentous instability. Negative Tien and Grind tests. There is no pain with patellar mobility or apprehension testing. The calf and thigh are soft, supple, and nontender. The patient is grossly neurovascularly intact distally. [de-identified] : No new imaging.

## 2024-07-29 NOTE — CONSULT LETTER
[Dear  ___] : Dear  [unfilled], [Consult Letter:] : I had the pleasure of evaluating your patient, [unfilled]. [Please see my note below.] : Please see my note below. [Sincerely,] : Sincerely, [FreeTextEntry3] : Dr. Kyle Kemp

## 2024-07-29 NOTE — DISCUSSION/SUMMARY
[de-identified] : We had a thorough discussion regarding the nature of her pain, the pathophysiology, as well as all treatment options. I discussed operative and non-operative treatment modalities. Patient was given prescription of formal physical therapy that she will perform 2x/wk for 6-8 wks. All questions were answered and the patient verbalized understanding. The patient is in agreement with this treatment plan. Patient will follow up in 6-8 wks for repeat clinical assessment.

## 2024-07-29 NOTE — HISTORY OF PRESENT ILLNESS
[de-identified] : ALEXANDRIA HAWKINS is a 44 year old female being seen for left knee pain. Endorses walking around in flip flops recently. Has been utilizing ice, heat and biofreeze with some mild relief. Also has been utilizing Voltaren gel. Denies buckling or catching to the knee.

## 2024-07-29 NOTE — ADDENDUM
[FreeTextEntry1] : Documented by Gosia Pugh acting as a scribe for Dr. Kemp and Aaron Collins PA-C on 07/29/2024. All medical record entries made by the Scribe were at my, Dr. Kemp, and Aaron Collins's, direction and personally dictated by me on 07/29/2024. I have reviewed the chart and agree that the record accurately reflects my personal performance of the history, physical exam, procedure and imaging.

## 2024-11-12 ENCOUNTER — APPOINTMENT (OUTPATIENT)
Dept: ORTHOPEDIC SURGERY | Facility: CLINIC | Age: 44
End: 2024-11-12
Payer: COMMERCIAL

## 2024-11-12 DIAGNOSIS — S83.207A UNSPECIFIED TEAR OF UNSPECIFIED MENISCUS, CURRENT INJURY, LEFT KNEE, INITIAL ENCOUNTER: ICD-10-CM

## 2024-11-12 PROCEDURE — 99214 OFFICE O/P EST MOD 30 MIN: CPT

## 2024-11-12 RX ORDER — MELOXICAM 15 MG/1
15 TABLET ORAL
Qty: 21 | Refills: 3 | Status: ACTIVE | COMMUNITY
Start: 2024-11-12 | End: 1900-01-01

## 2024-11-19 ENCOUNTER — NON-APPOINTMENT (OUTPATIENT)
Age: 44
End: 2024-11-19

## 2024-11-19 ENCOUNTER — APPOINTMENT (OUTPATIENT)
Dept: OBGYN | Facility: CLINIC | Age: 44
End: 2024-11-19

## 2024-11-19 VITALS
WEIGHT: 158 LBS | BODY MASS INDEX: 26.33 KG/M2 | HEART RATE: 93 BPM | HEIGHT: 65 IN | DIASTOLIC BLOOD PRESSURE: 80 MMHG | SYSTOLIC BLOOD PRESSURE: 116 MMHG

## 2024-11-19 DIAGNOSIS — Z15.89 GENETIC SUSCEPTIBILITY TO OTHER DISEASE: ICD-10-CM

## 2024-11-19 DIAGNOSIS — Z15.01 GENETIC SUSCEPTIBILITY TO OTHER DISEASE: ICD-10-CM

## 2024-11-19 DIAGNOSIS — Z91.89 OTHER SPECIFIED PERSONAL RISK FACTORS, NOT ELSEWHERE CLASSIFIED: ICD-10-CM

## 2024-11-19 DIAGNOSIS — Z01.419 ENCOUNTER FOR GYNECOLOGICAL EXAMINATION (GENERAL) (ROUTINE) W/OUT ABNORMAL FINDINGS: ICD-10-CM

## 2024-11-19 DIAGNOSIS — F41.9 ANXIETY DISORDER, UNSPECIFIED: ICD-10-CM

## 2024-11-19 PROCEDURE — 99396 PREV VISIT EST AGE 40-64: CPT

## 2024-11-19 PROCEDURE — 99459 PELVIC EXAMINATION: CPT

## 2024-11-21 ENCOUNTER — NON-APPOINTMENT (OUTPATIENT)
Age: 44
End: 2024-11-21

## 2024-11-25 LAB — CYTOLOGY CVX/VAG DOC THIN PREP: NORMAL

## 2024-12-05 DIAGNOSIS — S83.207A UNSPECIFIED TEAR OF UNSPECIFIED MENISCUS, CURRENT INJURY, LEFT KNEE, INITIAL ENCOUNTER: ICD-10-CM

## 2024-12-05 RX ORDER — ONDANSETRON 4 MG/1
4 TABLET ORAL
Qty: 42 | Refills: 0 | Status: COMPLETED | COMMUNITY
Start: 2024-12-05 | End: 2024-12-12

## 2024-12-05 RX ORDER — ASPIRIN ENTERIC COATED TABLETS 81 MG 81 MG/1
81 TABLET, DELAYED RELEASE ORAL DAILY
Qty: 14 | Refills: 0 | Status: ACTIVE | COMMUNITY
Start: 2024-12-05 | End: 1900-01-01

## 2024-12-05 RX ORDER — OXYCODONE 5 MG/1
5 TABLET ORAL
Qty: 15 | Refills: 0 | Status: ACTIVE | COMMUNITY
Start: 2024-12-05 | End: 1900-01-01

## 2024-12-06 ENCOUNTER — APPOINTMENT (OUTPATIENT)
Dept: ORTHOPEDIC SURGERY | Facility: AMBULATORY SURGERY CENTER | Age: 44
End: 2024-12-06

## 2024-12-06 PROCEDURE — 29880 ARTHRS KNE SRG MNISECTMY M&L: CPT | Mod: LT

## 2024-12-16 ENCOUNTER — APPOINTMENT (OUTPATIENT)
Dept: ORTHOPEDIC SURGERY | Facility: CLINIC | Age: 44
End: 2024-12-16
Payer: COMMERCIAL

## 2024-12-16 ENCOUNTER — NON-APPOINTMENT (OUTPATIENT)
Age: 44
End: 2024-12-16

## 2024-12-16 DIAGNOSIS — S83.207A UNSPECIFIED TEAR OF UNSPECIFIED MENISCUS, CURRENT INJURY, LEFT KNEE, INITIAL ENCOUNTER: ICD-10-CM

## 2024-12-16 PROCEDURE — 99024 POSTOP FOLLOW-UP VISIT: CPT

## 2024-12-19 ENCOUNTER — APPOINTMENT (OUTPATIENT)
Dept: ORTHOPEDIC SURGERY | Facility: CLINIC | Age: 44
End: 2024-12-19

## 2025-01-10 ENCOUNTER — NON-APPOINTMENT (OUTPATIENT)
Age: 45
End: 2025-01-10

## 2025-01-15 ENCOUNTER — APPOINTMENT (OUTPATIENT)
Dept: ORTHOPEDIC SURGERY | Facility: CLINIC | Age: 45
End: 2025-01-15
Payer: COMMERCIAL

## 2025-01-15 DIAGNOSIS — S83.207A UNSPECIFIED TEAR OF UNSPECIFIED MENISCUS, CURRENT INJURY, LEFT KNEE, INITIAL ENCOUNTER: ICD-10-CM

## 2025-01-15 PROCEDURE — 99024 POSTOP FOLLOW-UP VISIT: CPT

## 2025-01-27 ENCOUNTER — APPOINTMENT (OUTPATIENT)
Dept: GYNECOLOGIC ONCOLOGY | Facility: CLINIC | Age: 45
End: 2025-01-27

## 2025-01-27 VITALS
HEART RATE: 90 BPM | OXYGEN SATURATION: 100 % | SYSTOLIC BLOOD PRESSURE: 141 MMHG | BODY MASS INDEX: 26.66 KG/M2 | WEIGHT: 160 LBS | DIASTOLIC BLOOD PRESSURE: 84 MMHG | HEIGHT: 65 IN

## 2025-01-27 PROCEDURE — 99214 OFFICE O/P EST MOD 30 MIN: CPT

## 2025-02-19 ENCOUNTER — NON-APPOINTMENT (OUTPATIENT)
Age: 45
End: 2025-02-19

## 2025-03-05 ENCOUNTER — OUTPATIENT (OUTPATIENT)
Dept: OUTPATIENT SERVICES | Facility: HOSPITAL | Age: 45
LOS: 1 days | End: 2025-03-05
Payer: COMMERCIAL

## 2025-03-05 VITALS
OXYGEN SATURATION: 100 % | HEIGHT: 65 IN | SYSTOLIC BLOOD PRESSURE: 133 MMHG | WEIGHT: 160.06 LBS | HEART RATE: 88 BPM | DIASTOLIC BLOOD PRESSURE: 80 MMHG | TEMPERATURE: 97 F | RESPIRATION RATE: 14 BRPM

## 2025-03-05 DIAGNOSIS — Z98.890 OTHER SPECIFIED POSTPROCEDURAL STATES: Chronic | ICD-10-CM

## 2025-03-05 DIAGNOSIS — Z15.02 GENETIC SUSCEPTIBILITY TO MALIGNANT NEOPLASM OF OVARY: ICD-10-CM

## 2025-03-05 DIAGNOSIS — N84.0 POLYP OF CORPUS UTERI: ICD-10-CM

## 2025-03-05 DIAGNOSIS — Z01.818 ENCOUNTER FOR OTHER PREPROCEDURAL EXAMINATION: ICD-10-CM

## 2025-03-05 DIAGNOSIS — Z15.89 GENETIC SUSCEPTIBILITY TO OTHER DISEASE: ICD-10-CM

## 2025-03-05 LAB
ANION GAP SERPL CALC-SCNC: 7 MMOL/L — SIGNIFICANT CHANGE UP (ref 5–17)
BUN SERPL-MCNC: 10 MG/DL — SIGNIFICANT CHANGE UP (ref 7–23)
CALCIUM SERPL-MCNC: 9.3 MG/DL — SIGNIFICANT CHANGE UP (ref 8.5–10.1)
CHLORIDE SERPL-SCNC: 105 MMOL/L — SIGNIFICANT CHANGE UP (ref 96–108)
CO2 SERPL-SCNC: 27 MMOL/L — SIGNIFICANT CHANGE UP (ref 22–31)
CREAT SERPL-MCNC: 0.7 MG/DL — SIGNIFICANT CHANGE UP (ref 0.5–1.3)
EGFR: 108 ML/MIN/1.73M2 — SIGNIFICANT CHANGE UP
EGFR: 108 ML/MIN/1.73M2 — SIGNIFICANT CHANGE UP
GLUCOSE SERPL-MCNC: 98 MG/DL — SIGNIFICANT CHANGE UP (ref 70–99)
HCG SERPL-ACNC: <1 MIU/ML — SIGNIFICANT CHANGE UP
HCT VFR BLD CALC: 38.6 % — SIGNIFICANT CHANGE UP (ref 34.5–45)
HGB BLD-MCNC: 12 G/DL — SIGNIFICANT CHANGE UP (ref 11.5–15.5)
MCHC RBC-ENTMCNC: 19.2 PG — LOW (ref 27–34)
MCHC RBC-ENTMCNC: 31.1 G/DL — LOW (ref 32–36)
MCV RBC AUTO: 61.9 FL — LOW (ref 80–100)
NRBC BLD AUTO-RTO: 0 /100 WBCS — SIGNIFICANT CHANGE UP (ref 0–0)
PLATELET # BLD AUTO: 296 K/UL — SIGNIFICANT CHANGE UP (ref 150–400)
POTASSIUM SERPL-MCNC: 4 MMOL/L — SIGNIFICANT CHANGE UP (ref 3.5–5.3)
POTASSIUM SERPL-SCNC: 4 MMOL/L — SIGNIFICANT CHANGE UP (ref 3.5–5.3)
RBC # BLD: 6.24 M/UL — HIGH (ref 3.8–5.2)
RBC # FLD: 17.9 % — HIGH (ref 10.3–14.5)
SODIUM SERPL-SCNC: 139 MMOL/L — SIGNIFICANT CHANGE UP (ref 135–145)
WBC # BLD: 7.03 K/UL — SIGNIFICANT CHANGE UP (ref 3.8–10.5)
WBC # FLD AUTO: 7.03 K/UL — SIGNIFICANT CHANGE UP (ref 3.8–10.5)

## 2025-03-05 PROCEDURE — 36415 COLL VENOUS BLD VENIPUNCTURE: CPT

## 2025-03-05 PROCEDURE — 80048 BASIC METABOLIC PNL TOTAL CA: CPT

## 2025-03-05 PROCEDURE — 85027 COMPLETE CBC AUTOMATED: CPT

## 2025-03-05 PROCEDURE — 86850 RBC ANTIBODY SCREEN: CPT

## 2025-03-05 PROCEDURE — 86901 BLOOD TYPING SEROLOGIC RH(D): CPT

## 2025-03-05 PROCEDURE — 93005 ELECTROCARDIOGRAM TRACING: CPT

## 2025-03-05 PROCEDURE — 93010 ELECTROCARDIOGRAM REPORT: CPT

## 2025-03-05 PROCEDURE — 84702 CHORIONIC GONADOTROPIN TEST: CPT

## 2025-03-05 PROCEDURE — 86900 BLOOD TYPING SEROLOGIC ABO: CPT

## 2025-03-05 PROCEDURE — G0463: CPT

## 2025-03-05 NOTE — H&P PST ADULT - NSICDXPASTSURGICALHX_GEN_ALL_CORE_FT
PAST SURGICAL HISTORY:  H/O arthroscopy of left knee     H/O colonoscopy     H/O endoscopy     History of breast lift

## 2025-03-05 NOTE — H&P PST ADULT - PROBLEM SELECTOR PLAN 1
PST labs; CBC, BMP, HcG, Type & Screen. Medical clearance scheduled with PCP on 3/6/2025. Cardiac clearance scheduled on 3/7/2025. Will fax over PST results to both PCP and Cardiologist for review and clearances.  Pt instructed to stop any NSAIDS/Herbal Supplements between now and procedure. May take Tylenol if needed for any pain between now and procedure. Morning of procedure she may use Breztri inhaler ( as well as Albuterol Inhaler if needed). pt Was instructed to continue her Singulair at night as per her usual routine. Pt also instructed to follow dietary instructions day prior to procedure as per Dr Nogueira. Pre-op instructions as well as pre-op wash instructions given to pt with understanding verbalized. All questions addressed with pt prior to her leaving the PST department today.

## 2025-03-05 NOTE — H&P PST ADULT - NSICDXPASTMEDICALHX_GEN_ALL_CORE_FT
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

## 2025-03-05 NOTE — H&P PST ADULT - NSANTHOSAYNRD_GEN_A_CORE
No. QIAN screening performed.  STOP BANG Legend: 0-2 = LOW Risk; 3-4 = INTERMEDIATE Risk; 5-8 = HIGH Risk
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
## 2025-03-05 NOTE — H&P PST ADULT - ADDITIONAL PE
DX: Polyp Of Corpus Uteri  DX: Genetic Susceptibility Malignant Neoplasm Ovary  DX: Genetic Susceptibility To Other Disease

## 2025-03-05 NOTE — H&P PST ADULT - LAST CARDIAC ANGIOGRAM/IMAGING
Circumcision baby    Date/Time: 2021 12:02 PM  Performed by: Gilberto Pan MD  Authorized by: Gilberto Pan MD   Consent: Written consent obtained.  Risks and benefits: risks, benefits and alternatives were discussed  Consent given by: parent  Time out: Immediately prior to procedure a \"time out\" was called to verify the correct patient, procedure, equipment, support staff and site/side marked as required.  Penis normal: mild-moderate concealment of the penis.  Restraint: standard molded circumcision board  Local anesthetic: 3 mL 0.5% lidocaine without epi.  Prep used: Betadine  Clamp(s) used: Plastibell  Plastibell clamp size: 1.4 cm  Complications? No  Estimated blood loss (mL): 2  Comments: Good blanching of tissue was noted after application of the suture ligature.    Standard post-procedural instructions were provided and reviewed. All questions were answered to the parent's expressed satisfaction.    After the Plastibell falls off, advised family to send pictures to our RN to assess healing. If necessary, they should retract the shaft skin away from the glans before applying vaseline.          
Denies

## 2025-03-05 NOTE — H&P PST ADULT - COMMENTS
DX: Polyp Of Corpus Uteri - SEE HPI  DX: Genetic Susceptibility Malignant Neoplasm Of Ovary - SEE HPI  DX: Genetic Susceptibility To Other Disease- SEE HPI

## 2025-03-05 NOTE — H&P PST ADULT - HISTORY OF PRESENT ILLNESS
44 year old female  PMH  Asthma, Pulmonary Nodule, Biallelic Mutation Of RAD51C Gene, Anxiety, Suspected Lyme Disease, Covid-19, Notes prior H/O Left Knee Arthroscopy for torm meniscus, also notes breast lift in preparation for double mastectomy/deep flap secondary to gene mutations; now with Polyp Of Corpus Uteri, Genetic Susceptibility Malignant Neoplasm Of Ovary, Genetic Susceptibility To Other Disease; presents today for PST prior to Pelvic Exam Under Anesthesia - Laparoscopic Bilateral Salpingo-Oophorectomy - Possible Frozen Section Of Polyp - Possible hysterectomy- Hysteroscopy Polypectomy with Dr Rema Nogueira on 3/10/2025.     Pt notes "I have a Polyp in my uterus and I also have the gene for ovarian cancer (RAD 51) so I was referred to see Dr Nogueira by my GYN." Pt notes increased bleeding with her menses and she notes she has been monitoring Polyp with sonograms every 6 months. Also notes prior negative biopsy and thickened endometrial lining. Following consult, exam, review of all DX testing and discussions with Dr Nogueira regarding treatment options pt is electing for scheduled procedure.  44 year old female  PMH  Asthma, Pulmonary Nodule, Thalassemia Trait, Biallelic Mutation Of RAD51C Gene, Anxiety, Suspected Lyme Disease, Covid-19, Notes prior H/O Left Knee Arthroscopy for torm meniscus, also notes breast lift in preparation for double mastectomy/deep flap secondary to gene mutations; now with Polyp Of Corpus Uteri, Genetic Susceptibility Malignant Neoplasm Of Ovary, Genetic Susceptibility To Other Disease; presents today for PST prior to Pelvic Exam Under Anesthesia - Laparoscopic Bilateral Salpingo-Oophorectomy - Possible Frozen Section Of Polyp - Possible hysterectomy- Hysteroscopy Polypectomy with Dr Rema Nogueira on 3/10/2025.     Pt notes "I have a Polyp in my uterus and I also have the gene for ovarian cancer (RAD 51) so I was referred to see Dr Nogueira by my GYN." Pt notes increased bleeding with her menses and she notes she has been monitoring Polyp with sonograms every 6 months. Also notes prior negative biopsy and thickened endometrial lining. Following consult, exam, review of all DX testing and discussions with Dr Nogueira regarding treatment options pt is electing for scheduled procedure.

## 2025-03-05 NOTE — H&P PST ADULT - NSICDXFAMILYHX_GEN_ALL_CORE_FT
FAMILY HISTORY:  Father  Still living? No  Family history of prostate cancer in father, Age at diagnosis: Age Unknown    Mother  Still living? Yes, Estimated age: 71-80  Family history of hypertension, Age at diagnosis: Age Unknown

## 2025-03-05 NOTE — H&P PST ADULT - GENERAL COMMENTS
Came back from [place] 3/23/2025 - denies any recent/known exposure to anyone with covid - denies any current covid SX

## 2025-03-05 NOTE — H&P PST ADULT - ASSESSMENT
44 year old female  PMH  Asthma, Pulmonary Nodule, Biallelic Mutation Of RAD51C Gene, Anxiety, Suspected Lyme Disease, Covid-19, Notes prior H/O Left Knee Arthroscopy for torm meniscus, also notes breast lift in preparation for double mastectomy/deep flap secondary to gene mutations; now with Polyp Of Corpus Uteri, Genetic Susceptibility Malignant Neoplasm Of Ovary, Genetic Susceptibility To Other Disease; presents today for PST prior to Pelvic Exam Under Anesthesia - Laparoscopic Bilateral Salpingo-Oophorectomy - Possible Frozen Section Of Polyp - Possible hysterectomy- Hysteroscopy Polypectomy with Dr Rema Nogueira on 3/10/2025.    44 year old female  PMH  Asthma, Pulmonary Nodule, Thalassemia Trait, Biallelic Mutation Of RAD51C Gene, Anxiety, Suspected Lyme Disease, Covid-19, Notes prior H/O Left Knee Arthroscopy for torm meniscus, also notes breast lift in preparation for double mastectomy/deep flap secondary to gene mutations; now with Polyp Of Corpus Uteri, Genetic Susceptibility Malignant Neoplasm Of Ovary, Genetic Susceptibility To Other Disease; presents today for PST prior to Pelvic Exam Under Anesthesia - Laparoscopic Bilateral Salpingo-Oophorectomy - Possible Frozen Section Of Polyp - Possible hysterectomy- Hysteroscopy Polypectomy with Dr Rema Nogueira on 3/10/2025.

## 2025-03-05 NOTE — H&P PST ADULT - LAST STRESS TEST
Thinks she had a stress test "Many years ago but I do not remember why, maybe palpitations and it was fine" - Pt seeing Dr Martines for cardiac clearance

## 2025-03-07 NOTE — ASU PATIENT PROFILE, ADULT - NSICDXPASTMEDICALHX_GEN_ALL_CORE_FT
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

## 2025-03-10 ENCOUNTER — OUTPATIENT (OUTPATIENT)
Dept: OUTPATIENT SERVICES | Facility: HOSPITAL | Age: 45
LOS: 1 days | End: 2025-03-10
Payer: COMMERCIAL

## 2025-03-10 ENCOUNTER — TRANSCRIPTION ENCOUNTER (OUTPATIENT)
Age: 45
End: 2025-03-10

## 2025-03-10 VITALS
RESPIRATION RATE: 15 BRPM | DIASTOLIC BLOOD PRESSURE: 80 MMHG | HEART RATE: 84 BPM | SYSTOLIC BLOOD PRESSURE: 113 MMHG | TEMPERATURE: 98 F | OXYGEN SATURATION: 100 %

## 2025-03-10 VITALS
HEART RATE: 96 BPM | SYSTOLIC BLOOD PRESSURE: 97 MMHG | RESPIRATION RATE: 16 BRPM | TEMPERATURE: 98 F | OXYGEN SATURATION: 98 % | DIASTOLIC BLOOD PRESSURE: 86 MMHG | HEIGHT: 65 IN | WEIGHT: 160.06 LBS

## 2025-03-10 DIAGNOSIS — Z98.890 OTHER SPECIFIED POSTPROCEDURAL STATES: Chronic | ICD-10-CM

## 2025-03-10 DIAGNOSIS — Z01.818 ENCOUNTER FOR OTHER PREPROCEDURAL EXAMINATION: ICD-10-CM

## 2025-03-10 DIAGNOSIS — N84.0 POLYP OF CORPUS UTERI: ICD-10-CM

## 2025-03-10 DIAGNOSIS — Z15.02 GENETIC SUSCEPTIBILITY TO MALIGNANT NEOPLASM OF OVARY: ICD-10-CM

## 2025-03-10 DIAGNOSIS — Z15.89 GENETIC SUSCEPTIBILITY TO OTHER DISEASE: ICD-10-CM

## 2025-03-10 LAB
ABO RH CONFIRMATION: SIGNIFICANT CHANGE UP
HCG UR QL: NEGATIVE — SIGNIFICANT CHANGE UP

## 2025-03-10 PROCEDURE — 88305 TISSUE EXAM BY PATHOLOGIST: CPT

## 2025-03-10 PROCEDURE — 88305 TISSUE EXAM BY PATHOLOGIST: CPT | Mod: 26

## 2025-03-10 PROCEDURE — 81025 URINE PREGNANCY TEST: CPT

## 2025-03-10 PROCEDURE — 88304 TISSUE EXAM BY PATHOLOGIST: CPT

## 2025-03-10 PROCEDURE — 58558 HYSTEROSCOPY BIOPSY: CPT

## 2025-03-10 PROCEDURE — 36415 COLL VENOUS BLD VENIPUNCTURE: CPT

## 2025-03-10 PROCEDURE — 58661 LAPAROSCOPY REMOVE ADNEXA: CPT | Mod: 50

## 2025-03-10 PROCEDURE — C9399: CPT

## 2025-03-10 PROCEDURE — 58661 LAPAROSCOPY REMOVE ADNEXA: CPT | Mod: 50,GC

## 2025-03-10 PROCEDURE — C1782: CPT

## 2025-03-10 PROCEDURE — 88112 CYTOPATH CELL ENHANCE TECH: CPT

## 2025-03-10 PROCEDURE — 88304 TISSUE EXAM BY PATHOLOGIST: CPT | Mod: 26

## 2025-03-10 PROCEDURE — 88112 CYTOPATH CELL ENHANCE TECH: CPT | Mod: 26

## 2025-03-10 DEVICE — INTERCEED 3 X 4"
Type: IMPLANTABLE DEVICE | Status: NON-FUNCTIONAL
Removed: 2025-03-10

## 2025-03-10 DEVICE — MYOSURE TISSUE REMOVAL DEVICE LITE
Type: IMPLANTABLE DEVICE | Status: NON-FUNCTIONAL
Removed: 2025-03-10

## 2025-03-10 DEVICE — ARISTA 3GR
Type: IMPLANTABLE DEVICE | Status: NON-FUNCTIONAL
Removed: 2025-03-10

## 2025-03-10 RX ORDER — SODIUM CHLORIDE 9 G/1000ML
1000 INJECTION, SOLUTION INTRAVENOUS
Refills: 0 | Status: DISCONTINUED | OUTPATIENT
Start: 2025-03-10 | End: 2025-03-10

## 2025-03-10 RX ORDER — ONDANSETRON HCL/PF 4 MG/2 ML
4 VIAL (ML) INJECTION ONCE
Refills: 0 | Status: DISCONTINUED | OUTPATIENT
Start: 2025-03-10 | End: 2025-03-10

## 2025-03-10 RX ORDER — HYDROMORPHONE/SOD CHLOR,ISO/PF 2 MG/10 ML
0.5 SYRINGE (ML) INJECTION
Refills: 0 | Status: DISCONTINUED | OUTPATIENT
Start: 2025-03-10 | End: 2025-03-10

## 2025-03-10 RX ORDER — NAPROXEN SODIUM 275 MG
1 TABLET ORAL
Qty: 0 | Refills: 0 | DISCHARGE

## 2025-03-10 RX ORDER — METRONIDAZOLE 250 MG
500 TABLET ORAL ONCE
Refills: 0 | Status: COMPLETED | OUTPATIENT
Start: 2025-03-10 | End: 2025-03-10

## 2025-03-10 RX ORDER — ALBUTEROL SULFATE 2.5 MG/3ML
3 VIAL, NEBULIZER (ML) INHALATION
Refills: 0 | DISCHARGE

## 2025-03-10 RX ORDER — CEFAZOLIN SODIUM IN 0.9 % NACL 3 G/100 ML
2000 INTRAVENOUS SOLUTION, PIGGYBACK (ML) INTRAVENOUS ONCE
Refills: 0 | Status: COMPLETED | OUTPATIENT
Start: 2025-03-10 | End: 2025-03-10

## 2025-03-10 RX ORDER — OXYCODONE HYDROCHLORIDE 30 MG/1
5 TABLET ORAL ONCE
Refills: 0 | Status: DISCONTINUED | OUTPATIENT
Start: 2025-03-10 | End: 2025-03-10

## 2025-03-10 RX ORDER — ACETAMINOPHEN 500 MG/5ML
2 LIQUID (ML) ORAL
Qty: 0 | Refills: 0 | DISCHARGE

## 2025-03-10 RX ORDER — MONTELUKAST SODIUM 10 MG/1
1 TABLET ORAL
Refills: 0 | DISCHARGE

## 2025-03-10 NOTE — ASU PREOP CHECKLIST - ISOLATION PRECAUTIONS
PLAN:  1. Follow up with GYN to assess for menopause  2. Referral to Dr. Leesa Carreon group for hx of pulmonary embolism with residual back pain  3. Trial off of Metoprolol at this time. If BP goes above 140/90 consider   4. Continue exercising  5. Follow as needed  6. No need for cardiac testing at this time.
none

## 2025-03-10 NOTE — BRIEF OPERATIVE NOTE - NSICDXBRIEFPROCEDURE_GEN_ALL_CORE_FT
PROCEDURES:  Exam under anesthesia, pelvic 10-Mar-2025 15:51:11  Aaron Gibson  Laparoscopic bilateral salpingo-oophorectomy 10-Mar-2025 15:51:41  Aaron Gibson  Hysteroscopic polypectomy of uterus 10-Mar-2025 15:53:45  Aaron Gibson

## 2025-03-10 NOTE — ASU DISCHARGE PLAN (ADULT/PEDIATRIC) - ASU DC SPECIAL INSTRUCTIONSFT
Keep glue clean, dry and intact x 24 hrs. Apply water proof ice pack 20 mins on, 20 mins off to help decrease pain and swelling. You may begin showering as usual but Do NOT pick glue. Do not scrub or soak incision site. Pat dry. NO tub baths, NO swimming pools. No hot tubs.  Do not lift anything over 10 lbs.  Take frequent walks.  You may take over the counter stool softener such as colace as needed for constipation while taking pain medication.  Take over the counter Tylenol or Motrin/Ibuprofen as needed for  mild/moderate pain.

## 2025-03-10 NOTE — ASU DISCHARGE PLAN (ADULT/PEDIATRIC) - DISCHARGE PLAN IS COMPLETE AND GIVEN TO PATIENT
DC Instructions reviewed with patient and  Francois at patient bedside. Asked appropriate questions and verbalized understanding./: Yes

## 2025-03-10 NOTE — ASU DISCHARGE PLAN (ADULT/PEDIATRIC) - CARE PROVIDER_API CALL
Rema Nogueira  Gynecologic Oncology  [address] [place] [address]  Phone: [phone]  Fax: [phone]  Follow Up Time:

## 2025-03-10 NOTE — ASU DISCHARGE PLAN (ADULT/PEDIATRIC) - FINANCIAL ASSISTANCE
[facility] provides services at a reduced cost to those who are determined to be eligible through [facility]’s financial assistance program. Information regarding [facility]’s financial assistance program can be found by going to [email or web address] or by calling [phone].

## 2025-03-10 NOTE — BRIEF OPERATIVE NOTE - NSICDXBRIEFPOSTOP_GEN_ALL_CORE_FT
POST-OP DIAGNOSIS:  Positive test for genetic marker of susceptibility to malignant neoplasm of ovary 10-Mar-2025 15:56:04  Aaron Gibson  Polyp of corpus uteri 10-Mar-2025 15:55:53  Aaron Gibson

## 2025-03-10 NOTE — BRIEF OPERATIVE NOTE - NSICDXBRIEFPREOP_GEN_ALL_CORE_FT
PRE-OP DIAGNOSIS:  Polyp of corpus uteri 10-Mar-2025 15:54:36  Aaron Gibson  Positive test for genetic marker of susceptibility to malignant neoplasm of ovary 10-Mar-2025 15:55:39  Aaron Gibson

## 2025-03-17 ENCOUNTER — NON-APPOINTMENT (OUTPATIENT)
Age: 45
End: 2025-03-17

## 2025-03-27 ENCOUNTER — APPOINTMENT (OUTPATIENT)
Dept: GYNECOLOGIC ONCOLOGY | Facility: CLINIC | Age: 45
End: 2025-03-27
Payer: COMMERCIAL

## 2025-03-27 VITALS
TEMPERATURE: 97.9 F | HEART RATE: 78 BPM | OXYGEN SATURATION: 100 % | SYSTOLIC BLOOD PRESSURE: 118 MMHG | HEIGHT: 65 IN | WEIGHT: 160 LBS | DIASTOLIC BLOOD PRESSURE: 81 MMHG | BODY MASS INDEX: 26.66 KG/M2

## 2025-03-27 DIAGNOSIS — N89.8 OTHER SPECIFIED NONINFLAMMATORY DISORDERS OF VAGINA: ICD-10-CM

## 2025-03-27 PROBLEM — Z15.02 GENETIC SUSCEPTIBILITY TO MALIGNANT NEOPLASM OF OVARY: Chronic | Status: ACTIVE | Noted: 2025-03-05

## 2025-03-27 PROBLEM — N84.0 POLYP OF CORPUS UTERI: Chronic | Status: ACTIVE | Noted: 2025-03-05

## 2025-03-27 PROBLEM — R68.89 OTHER GENERAL SYMPTOMS AND SIGNS: Chronic | Status: ACTIVE | Noted: 2025-03-05

## 2025-03-27 PROBLEM — E78.9 DISORDER OF LIPOPROTEIN METABOLISM, UNSPECIFIED: Chronic | Status: ACTIVE | Noted: 2025-03-05

## 2025-03-27 PROBLEM — U07.1 COVID-19: Chronic | Status: ACTIVE | Noted: 2025-03-05

## 2025-03-27 PROBLEM — R91.1 SOLITARY PULMONARY NODULE: Chronic | Status: ACTIVE | Noted: 2025-03-05

## 2025-03-27 PROBLEM — Z15.89 GENETIC SUSCEPTIBILITY TO OTHER DISEASE: Chronic | Status: ACTIVE | Noted: 2025-03-05

## 2025-03-27 PROBLEM — J45.909 UNSPECIFIED ASTHMA, UNCOMPLICATED: Chronic | Status: ACTIVE | Noted: 2025-03-05

## 2025-03-27 PROBLEM — D56.3 THALASSEMIA MINOR: Chronic | Status: ACTIVE | Noted: 2025-03-05

## 2025-03-27 PROCEDURE — 99213 OFFICE O/P EST LOW 20 MIN: CPT

## 2025-03-27 RX ORDER — ESTRADIOL 10 UG/1
10 TABLET, FILM COATED VAGINAL
Qty: 16 | Refills: 0 | Status: ACTIVE | COMMUNITY
Start: 2025-03-27 | End: 1900-01-01

## 2025-04-03 DIAGNOSIS — E89.40 ASYMPTOMATIC POSTPROCEDURAL OVARIAN FAILURE: ICD-10-CM

## 2025-04-03 RX ORDER — PAROXETINE HYDROCHLORIDE 10 MG/1
10 TABLET, FILM COATED ORAL DAILY
Qty: 30 | Refills: 1 | Status: ACTIVE | COMMUNITY
Start: 2025-04-03 | End: 1900-01-01

## 2025-04-07 ENCOUNTER — APPOINTMENT (OUTPATIENT)
Dept: ORTHOPEDIC SURGERY | Facility: CLINIC | Age: 45
End: 2025-04-07
Payer: COMMERCIAL

## 2025-04-07 VITALS — HEIGHT: 65 IN | BODY MASS INDEX: 26.66 KG/M2 | WEIGHT: 160 LBS

## 2025-04-07 DIAGNOSIS — L90.5 SCAR CONDITIONS AND FIBROSIS OF SKIN: ICD-10-CM

## 2025-04-07 DIAGNOSIS — Z98.890 OTHER SPECIFIED POSTPROCEDURAL STATES: ICD-10-CM

## 2025-04-07 DIAGNOSIS — S83.207A UNSPECIFIED TEAR OF UNSPECIFIED MENISCUS, CURRENT INJURY, LEFT KNEE, INITIAL ENCOUNTER: ICD-10-CM

## 2025-04-07 PROCEDURE — 99214 OFFICE O/P EST MOD 30 MIN: CPT

## 2025-04-15 ENCOUNTER — APPOINTMENT (OUTPATIENT)
Dept: OBGYN | Facility: CLINIC | Age: 45
End: 2025-04-15

## 2025-05-01 ENCOUNTER — APPOINTMENT (OUTPATIENT)
Dept: GYNECOLOGIC ONCOLOGY | Facility: CLINIC | Age: 45
End: 2025-05-01

## 2025-05-01 VITALS — HEART RATE: 77 BPM | DIASTOLIC BLOOD PRESSURE: 80 MMHG | SYSTOLIC BLOOD PRESSURE: 119 MMHG | OXYGEN SATURATION: 99 %

## 2025-05-01 PROCEDURE — 99213 OFFICE O/P EST LOW 20 MIN: CPT

## 2025-05-01 PROCEDURE — G2211 COMPLEX E/M VISIT ADD ON: CPT | Mod: NC

## 2025-06-09 ENCOUNTER — APPOINTMENT (OUTPATIENT)
Dept: GYNECOLOGIC ONCOLOGY | Facility: CLINIC | Age: 45
End: 2025-06-09
Payer: COMMERCIAL

## 2025-06-09 PROCEDURE — 99213 OFFICE O/P EST LOW 20 MIN: CPT | Mod: 95

## 2025-07-03 ENCOUNTER — RX RENEWAL (OUTPATIENT)
Age: 45
End: 2025-07-03

## 2025-08-14 ENCOUNTER — APPOINTMENT (OUTPATIENT)
Dept: ORTHOPEDIC SURGERY | Facility: CLINIC | Age: 45
End: 2025-08-14
Payer: COMMERCIAL

## 2025-08-14 DIAGNOSIS — Z98.890 OTHER SPECIFIED POSTPROCEDURAL STATES: ICD-10-CM

## 2025-08-14 DIAGNOSIS — S83.207A UNSPECIFIED TEAR OF UNSPECIFIED MENISCUS, CURRENT INJURY, LEFT KNEE, INITIAL ENCOUNTER: ICD-10-CM

## 2025-08-14 PROCEDURE — 99214 OFFICE O/P EST MOD 30 MIN: CPT

## (undated) DEVICE — SOL INJ NS 0.9% 1000ML

## (undated) DEVICE — SOL IRR POUR NS 0.9% 1000ML

## (undated) DEVICE — CONNECTOR 5 IN1 SUCTION TUBING

## (undated) DEVICE — BLADE SCALPEL SAFETYLOCK #11

## (undated) DEVICE — APPLICATOR VISTASEAL LAP DUAL 35CM RIGID

## (undated) DEVICE — SOL INJ NS 0.9% 500ML 1-PORT

## (undated) DEVICE — FLUENT FMS PROCEDURE KIT

## (undated) DEVICE — APPLICATOR ENDOSCOPIC FOR SUGIFLO

## (undated) DEVICE — TUBING GYRUS ACMI COLLECTION SET 6FT

## (undated) DEVICE — SOL IRR BAG H2O 2000ML

## (undated) DEVICE — DRSG DERMABOND 0.7ML

## (undated) DEVICE — ENDOCATCH 10MM

## (undated) DEVICE — TROCAR COVIDIEN VERSAPORT BLADELESS OPTICAL 11MM STANDARD

## (undated) DEVICE — Device

## (undated) DEVICE — PACK LITHOTOMY

## (undated) DEVICE — GLV 7.5 PROTEXIS (WHITE)

## (undated) DEVICE — MYOSURE SCOPE SEAL

## (undated) DEVICE — SYR LUER LOK 10CC

## (undated) DEVICE — TROCAR GELPOINT MINI ADVANCED

## (undated) DEVICE — TROCAR COVIDIEN VERSAPORT BLADELESS OPTICAL 15MM STANDARD

## (undated) DEVICE — PLV-SCD MACHINE: Type: DURABLE MEDICAL EQUIPMENT

## (undated) DEVICE — DRAPE TOWEL BLUE 17" X 24"

## (undated) DEVICE — FOLEY TRAY 14FR 5CC LF UMETER CLOSED

## (undated) DEVICE — TROCAR COVIDIEN VERSAPORT BLADELESS OPTICAL 5MM STANDARD

## (undated) DEVICE — ABDOMINAL BINDER MED/LG 12" X 36"-54"

## (undated) DEVICE — DRAPE LIGHT HANDLE COVER (GREEN)

## (undated) DEVICE — TUBING STRYKER PNEUMOSURE HEATED RTP

## (undated) DEVICE — DRAPE 3/4 SHEET 52X76"

## (undated) DEVICE — PREP TRAY DRY SKIN PREP SCRUB

## (undated) DEVICE — WARMING BLANKET UPPER ADULT

## (undated) DEVICE — SMOKE EVACUTATION SYS LAPROSCOPIC AC/PA

## (undated) DEVICE — VENODYNE/SCD SLEEVE CALF LARGE

## (undated) DEVICE — UTERINE MANIPULATOR COOPER SURGICAL 5MM 33CM GREEN

## (undated) DEVICE — ELCTR GROUNDING PAD ADULT COVIDIEN

## (undated) DEVICE — ENDOCATCH II 15MM

## (undated) DEVICE — TUBING IRR SET FOR CYSTOSCOPY 77"

## (undated) DEVICE — SUT MONOCRYL 4-0 27" PS-2 UNDYED

## (undated) DEVICE — PREP CHLORAPREP HI-LITE ORANGE 26ML

## (undated) DEVICE — LAP PAD W RING 18 X 18"

## (undated) DEVICE — VACUUM CURETTE BERKLEY OLYMPUS CURVED 7MM

## (undated) DEVICE — VENODYNE/SCD SLEEVE CALF MEDIUM

## (undated) DEVICE — PSP-SCD MACHINE: Type: DURABLE MEDICAL EQUIPMENT

## (undated) DEVICE — D HELP - CLEARVIEW CLEARIFY SYSTEM

## (undated) DEVICE — PACK GYN LAPAROSCOPY

## (undated) DEVICE — TROCAR COVIDIEN VERSAONE FIXATION CANNULA 5MM

## (undated) DEVICE — SOL IRR POUR H2O 1000ML

## (undated) DEVICE — DRAPE MAYO STAND 23"

## (undated) DEVICE — GLV 7.5 PROTEXIS ORTHO (CREAM)

## (undated) DEVICE — SUT POLYSORB 2-0 30" GU-46

## (undated) DEVICE — SUT POLYSORB 0 30" GU-46

## (undated) DEVICE — LIGASURE BLUNT TIP 44CM

## (undated) DEVICE — APPLICATOR SURGICEL LAP TROCAR POINT 2.5MM X 150MM